# Patient Record
Sex: FEMALE | Race: WHITE | NOT HISPANIC OR LATINO | Employment: STUDENT | URBAN - METROPOLITAN AREA
[De-identification: names, ages, dates, MRNs, and addresses within clinical notes are randomized per-mention and may not be internally consistent; named-entity substitution may affect disease eponyms.]

---
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
## 2021-02-11 ENCOUNTER — MYC REFILL (OUTPATIENT)
Dept: FAMILY MEDICINE | Facility: CLINIC | Age: 21
End: 2021-02-11

## 2021-02-11 DIAGNOSIS — Z86.19 HISTORY OF COLD SORES: ICD-10-CM

## 2021-02-11 RX ORDER — VALACYCLOVIR HYDROCHLORIDE 500 MG/1
500 TABLET, FILM COATED ORAL DAILY
Qty: 30 TABLET | Refills: 0 | Status: SHIPPED | OUTPATIENT
Start: 2021-02-11 | End: 2021-05-17

## 2021-02-11 NOTE — TELEPHONE ENCOUNTER
Pending Prescriptions:                       Disp   Refills    valACYclovir (VALTREX) 500 MG tablet       90 tab*0        Sig: Take 1 tablet (500 mg) by mouth daily        Routing refill request to provider for review/approval because:  Patient needs to be seen because:  Over a year.     Last Seen: 08/06/2019  Last Refilled: 05/08/20   Next Visit: SUZIE Zavala RN, BSN  [place] [facility]  February 11, 2021

## 2021-03-10 DIAGNOSIS — Z86.19 HISTORY OF COLD SORES: ICD-10-CM

## 2021-03-11 NOTE — TELEPHONE ENCOUNTER
Pending Prescriptions:                       Disp   Refills    valACYclovir (VALTREX) 500 MG tablet       30 tab*0        Sig: Take 1 tablet (500 mg) by mouth daily    Routing refill request to provider for review/approval because:  Labs not current:    Creatinine   Date Value Ref Range Status   10/20/2017 0.68 0.50 - 1.00 mg/dL Final         Patient needs to be seen because it has been more than 1 year since last office visit.

## 2021-03-12 ENCOUNTER — MYC REFILL (OUTPATIENT)
Dept: FAMILY MEDICINE | Facility: CLINIC | Age: 21
End: 2021-03-12

## 2021-03-12 DIAGNOSIS — Z86.19 HISTORY OF COLD SORES: ICD-10-CM

## 2021-03-12 RX ORDER — VALACYCLOVIR HYDROCHLORIDE 500 MG/1
500 TABLET, FILM COATED ORAL DAILY
Qty: 30 TABLET | Refills: 0 | OUTPATIENT
Start: 2021-03-12

## 2021-03-12 NOTE — TELEPHONE ENCOUNTER
Spoke with pt and informed her to schedule her appt but declined as she is still in [place].      Nena Goyal CMA ([facility])

## 2021-03-17 ENCOUNTER — MYC REFILL (OUTPATIENT)
Dept: FAMILY MEDICINE | Facility: CLINIC | Age: 21
End: 2021-03-17

## 2021-03-17 DIAGNOSIS — Z86.19 HISTORY OF COLD SORES: ICD-10-CM

## 2021-03-18 ENCOUNTER — MYC MEDICAL ADVICE (OUTPATIENT)
Dept: FAMILY MEDICINE | Facility: CLINIC | Age: 21
End: 2021-03-18

## 2021-03-18 DIAGNOSIS — Z86.19 HISTORY OF COLD SORES: ICD-10-CM

## 2021-03-18 RX ORDER — VALACYCLOVIR HYDROCHLORIDE 500 MG/1
500 TABLET, FILM COATED ORAL DAILY
Qty: 30 TABLET | Refills: 0 | OUTPATIENT
Start: 2021-03-18

## 2021-03-18 NOTE — TELEPHONE ENCOUNTER
Pending Prescriptions:                       Disp   Refills    valACYclovir (VALTREX) 500 MG tablet       30 tab*0        Sig: Take 1 tablet (500 mg) by mouth daily      Routing refill request to provider for review/approval because:  Akila given x1 and patient did not follow up, please advise    Inez Mccray RN on 3/18/2021 at 11:18 AM

## 2021-03-19 RX ORDER — VALACYCLOVIR HYDROCHLORIDE 500 MG/1
500 TABLET, FILM COATED ORAL DAILY
Qty: 30 TABLET | Refills: 0 | OUTPATIENT
Start: 2021-03-19

## 2021-03-19 NOTE — TELEPHONE ENCOUNTER
Pending Prescriptions:                       Disp   Refills    valACYclovir (VALTREX) 500 MG tablet       30 tab*0        Sig: Take 1 tablet (500 mg) by mouth daily      Routing refill request to provider for review/approval because:  Akila given x1 and patient did not follow up, please advise  Labs not current:  creatinine    Inez Mccray, RN on 3/19/2021 at 9:40 AM

## 2021-04-04 ENCOUNTER — HEALTH MAINTENANCE LETTER (OUTPATIENT)
Age: 21
End: 2021-04-04

## 2021-04-23 DIAGNOSIS — R00.0 TACHYCARDIA: ICD-10-CM

## 2021-04-23 RX ORDER — ATENOLOL 50 MG/1
50 TABLET ORAL DAILY
Qty: 90 TABLET | Refills: 0 | Status: SHIPPED | OUTPATIENT
Start: 2021-04-23 | End: 2021-07-19 | Stop reason: ALTCHOICE

## 2021-04-23 NOTE — LETTER
April 23, 2021       TO: Elizabeth Ulloa  [address] [facility] [address]       Dear Elizabeth Ulloa,    We recently received a call from your pharmacy requesting a refill of your Atenolol.    Our records indicate that you are due for follow-up with your Heart Care Provider. We will refill your medications for 3 months which will allow you enough time to be seen.    Please call [phone] to schedule your appointment.    Thank you for allowing [facility] to be a part of your health care team and we look forward to seeing you soon.    Thank you,    [facility]

## 2021-04-23 NOTE — TELEPHONE ENCOUNTER
Received refill request for:  Atenolol  Last OV was: 1/13/2020  Labs/EKG: na  F/U scheduled: overdue orders from 1/202.  Note to pharmacy and letter sent.  New script sent to: [facility]

## 2021-05-17 ENCOUNTER — OFFICE VISIT (OUTPATIENT)
Dept: DERMATOLOGY | Facility: CLINIC | Age: 21
End: 2021-05-17
Payer: COMMERCIAL

## 2021-05-17 VITALS — RESPIRATION RATE: 20 BRPM | DIASTOLIC BLOOD PRESSURE: 72 MMHG | SYSTOLIC BLOOD PRESSURE: 131 MMHG | HEART RATE: 62 BPM

## 2021-05-17 DIAGNOSIS — D18.01 HEMANGIOMA OF SKIN: Primary | ICD-10-CM

## 2021-05-17 DIAGNOSIS — Z86.19 HISTORY OF COLD SORES: ICD-10-CM

## 2021-05-17 PROCEDURE — 99214 OFFICE O/P EST MOD 30 MIN: CPT | Performed by: PHYSICIAN ASSISTANT

## 2021-05-17 RX ORDER — VALACYCLOVIR HYDROCHLORIDE 500 MG/1
500 TABLET, FILM COATED ORAL DAILY
Qty: 90 TABLET | Refills: 3 | Status: SHIPPED | OUTPATIENT
Start: 2021-05-17 | End: 2022-05-19

## 2021-05-17 NOTE — PROGRESS NOTES
HPI:   Elizabeth Ulloa is a 20 year old female who presents for recheck of vascular growth on the scalp. Area has grown recently. She and mother wouldlike to discuss options for removal.     Location: right frontal scalp   Condition present for:  Many years - since childhood but not since birth.   Previous treatments include: has unknown laser years ago which did not seem to help.       PHYSICAL EXAM:    /72 (BP Location: Right arm, Patient Position: Sitting, Cuff Size: Adult Regular)   Pulse 62   Resp 20   Skin exam performed as follows: Type 1 skin. Mood appropriate  Alert and Oriented X 3. Well developed, well nourished in no distress.  General appearance: Normal  Head including face: Normal  Eyes: conjunctiva and lids: Normal  Mouth: Lips, teeth, gums: Normal  Neck: Normal  Chest-breast/axillae: Normal  Back: Normal  Spleen and liver: Normal  Cardiovascular: Exam of peripheral vascular system by observation for swelling, varicosities, edema: Normal  Genitalia: groin, buttocks: Normal  Extremities: digits/nails (clubbing): Normal  Eccrine and Apocrine glands: Normal  Right upper extremity: Normal  Left upper extremity: Normal  Right lower extremity: Normal  Left lower extremity: Normal  Skin: Scalp and body hair: See below    1. 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 Saw peds derm in 2016 who questioned possible lymphatic involvement. She is quite nervous about removal and would like to be placed under general anesthesia. Will refer to general surgery.  2. History of HSV - gets > 6 outbreaks per year.   --Start valtrex daily           Follow-up: PRN  CC:   Scribed By: Rahel Coleman, MS, PA-C

## 2021-05-17 NOTE — PROGRESS NOTES
Chief Complaint   Patient presents with     RECHECK     Lesion on right head- measure        Vitals:    05/17/21 1013   BP: 131/72   BP Location: Right arm   Patient Position: Sitting   Cuff Size: Adult Regular   Pulse: 62   Resp: 20     Wt Readings from Last 1 Encounters:   01/13/20 78.1 kg (172 lb 1.6 oz) (94 %, Z= 1.52)*     * Growth percentiles are based on CDC (Girls, 2-20 Years) data.       5/17/2021    Mariano GUAJARDO RN   [facility]

## 2021-05-17 NOTE — LETTER
5/17/2021         RE: Elizabeth Ulloa  [address] [facility] [address]        Dear Colleague,    Thank you for referring your patient, Elizabeth Ulloa, to the [facility]. Please see a copy of my visit note below.    Chief Complaint   Patient presents with     RECHECK     Lesion on right head- measure        Vitals:    05/17/21 1013   BP: 131/72   BP Location: Right arm   Patient Position: Sitting   Cuff Size: Adult Regular   Pulse: 62   Resp: 20     Wt Readings from Last 1 Encounters:   01/13/20 78.1 kg (172 lb 1.6 oz) (94 %, Z= 1.52)*     * Growth percentiles are based on CDC (Girls, 2-20 Years) data.       5/17/2021    Mariano GUAJARDO RN   Specialty Clinics       HPI:   Elizabeth Ulloa is a 20 year old female who presents for recheck of vascular growth on the scalp. Area has grown recently. She and mother wouldlike to discuss options for removal.     Location: right frontal scalp   Condition present for:  Many years - since childhood but not since birth.   Previous treatments include: has unknown laser years ago which did not seem to help.       PHYSICAL EXAM:    /72 (BP Location: Right arm, Patient Position: Sitting, Cuff Size: Adult Regular)   Pulse 62   Resp 20   Skin exam performed as follows: Type 1 skin. Mood appropriate  Alert and Oriented X 3. Well developed, well nourished in no distress.  General appearance: Normal  Head including face: Normal  Eyes: conjunctiva and lids: Normal  Mouth: Lips, teeth, gums: Normal  Neck: Normal  Chest-breast/axillae: Normal  Back: Normal  Spleen and liver: Normal  Cardiovascular: Exam of peripheral vascular system by observation for swelling, varicosities, edema: Normal  Genitalia: groin, buttocks: Normal  Extremities: digits/nails (clubbing): Normal  Eccrine and Apocrine glands: Normal  Right upper extremity: Normal  Left upper extremity: Normal  Right lower extremity: Normal  Left lower extremity: Normal  Skin: Scalp and body  hair: See below    1. 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 Saw peds derm in 2016 who questioned possible lymphatic involvement. She is quite nervous about removal and would like to be placed under general anesthesia. Will refer to general surgery.  2. History of HSV - gets > 6 outbreaks per year.   --Start valtrex daily           Follow-up: PRN  CC:   Scribed By: Rahel Coleman MS, PA-C        Again, thank you for allowing me to participate in the care of your patient.        Sincerely,        Rahel Coleman PA-C

## 2021-05-18 ENCOUNTER — TELEPHONE (OUTPATIENT)
Dept: DERMATOLOGY | Facility: CLINIC | Age: 21
End: 2021-05-18

## 2021-05-18 NOTE — TELEPHONE ENCOUNTER
Please call patient - I discussed removal of the hemangioma with Dr Varma and he recommended she be referred to general surgery for the removal (because she would like to be put under for the procedure). Let me know if she needs a referral.

## 2021-05-19 NOTE — TELEPHONE ENCOUNTER
Left message for pt to call back for message from provider.  Dara JOLLY RN BSN PHN  [facility]ty Clin[facility]

## 2021-05-20 NOTE — TELEPHONE ENCOUNTER
Pt notified of below.  Pt reports understanding.  Pt does not have further questions or concerns.    Patient is not ready to see Gen Surg yet.  She will call back for an appointment when ready.    Evy Strong   [facility]  RN

## 2021-06-21 ENCOUNTER — OFFICE VISIT (OUTPATIENT)
Dept: CARDIOLOGY | Facility: CLINIC | Age: 21
End: 2021-06-21
Payer: COMMERCIAL

## 2021-06-21 VITALS
HEIGHT: 65 IN | BODY MASS INDEX: 29.36 KG/M2 | WEIGHT: 176.2 LBS | HEART RATE: 68 BPM | OXYGEN SATURATION: 100 % | DIASTOLIC BLOOD PRESSURE: 86 MMHG | SYSTOLIC BLOOD PRESSURE: 118 MMHG

## 2021-06-21 DIAGNOSIS — R00.0 TACHYCARDIA: ICD-10-CM

## 2021-06-21 PROCEDURE — 99213 OFFICE O/P EST LOW 20 MIN: CPT | Performed by: INTERNAL MEDICINE

## 2021-06-21 RX ORDER — METOPROLOL SUCCINATE 50 MG/1
50 TABLET, EXTENDED RELEASE ORAL DAILY
Qty: 90 TABLET | Refills: 3 | Status: SHIPPED | OUTPATIENT
Start: 2021-06-21 | End: 2021-10-14 | Stop reason: ALTCHOICE

## 2021-06-21 RX ORDER — ATENOLOL 50 MG/1
50 TABLET ORAL DAILY
Qty: 90 TABLET | Refills: 3 | Status: CANCELLED | OUTPATIENT
Start: 2021-06-21

## 2021-06-21 ASSESSMENT — MIFFLIN-ST. JEOR: SCORE: 1571.31

## 2021-06-21 NOTE — LETTER
6/21/2021    Physician No Ref-Primary  No address on file    RE: Elizabeth Ulloa       Dear Colleague,    I had the pleasure of seeing Elizabeth Ulloa in the [facility].    HISTORY:    Elizabeth Ulloa is a pleasant 20-year-old female accompanied by her mother and a sibling today.  She has a history of palpitations previously cared for through [facility] pediatric cardiology department.  Her monitor showed what was believed to be sinus tachycardia although atrial tachycardia was a possibility, so she underwent an EP study in February 2017.  She was noted at that time to have a dual AV node physiology but did not have inducible SVT so ablation was not felt to be useful.  He was started on atenolol twice daily at that time.    Historically a Zio patch done before her EP work-up showed several episodes of SVT, the longest lasting 26 minutes with a rate ranging from 174-307, averaging 227.  The rhythm was narrow and regular.  A follow-up study on atenolol showed no episodes of SVT but some once of palpitations was reported and was associated with mild sinus tachycardia.    I first saw Elizabeth in January of last year at which time she told me that she had not experienced any episodes of SVT in the past year.  Today she reports that her symptoms are about the same as they were when she first met me, although now she reports that she experiences a sense of palpitations about once a month.  These episodes typically last 1 to 2 minutes and are only mildly bothersome.  She has not had syncope or near syncope.  She has never identified any regular mechanism.  Her episodes seem to be completely random.  She acknowledges that while she is aware of these episodes, she is only mildly bothered by them and finds them very tolerable.      ASSESSMENT/PLAN:    1.  SVT.  Previous EP study showed dual AV node physiology but the patient was noninducible and she did not undergo  ablation.  She is continuing to use atenolol on a daily basis.  I switched her off of atenolol and onto metoprolol because she is a female of childbearing age and metoprolol is a safer beta-blocker should she become pregnant.  She seems to have had good results with the atenolol and she is satisfied with the small degree of residual palpitation she still experiences.  I am hoping that she will have an identical experience with her Toprol.  She will call me if she does not.  Her mother wondered whether her atenolol might be the cause of her anxiety.  I think this is very unlikely but we will be switching medications so the question is moot.    Thank you for inviting me to participate in your patient's care.  2-year follow-up will be planned but I would be happy to see her sooner should her palpitations worsen or should there be other cardiology issues.  I spent greater than 20 minutes today reviewing the chart, interviewing the patient, and documenting our visit.    Chart documentation was completed, in part, with [employer] voice-recognition software. Even though reviewed, some grammatical, spelling, and word errors may remain.       Orders Placed This Encounter   Procedures     Follow-Up with Cardiologist     Orders Placed This Encounter   Medications     metoprolol succinate ER (TOPROL XL) 50 MG 24 hr tablet     Sig: Take 1 tablet (50 mg) by mouth daily     Dispense:  90 tablet     Refill:  3     There are no discontinued medications.    10 year ASCVD risk: The ASCVD Risk score ([place] BENNIE Jr., et al., 2013) failed to calculate for the following reasons:    The 2013 ASCVD risk score is only valid for ages 40 to 79    Encounter Diagnosis   Name Primary?     Tachycardia        CURRENT MEDICATIONS:  Current Outpatient Medications   Medication Sig Dispense Refill     atenolol (TENORMIN) 50 MG tablet Take 1 tablet (50 mg) by mouth daily 90 tablet 0     levonorgestrel (MIRENA) 20 MCG/24HR IUD 1 each (20 mcg) by Intrauterine  route continuous       metoprolol succinate ER (TOPROL XL) 50 MG 24 hr tablet Take 1 tablet (50 mg) by mouth daily 90 tablet 3     valACYclovir (VALTREX) 500 MG tablet Take 1 tablet (500 mg) by mouth daily 90 tablet 3     glycopyrrolate (ROBINUL-FORTE) 2 MG tablet 1-3 tablets daily PRN (Patient not taking: Reported on 6/21/2021) 90 tablet 5       ALLERGIES     Allergies   Allergen Reactions     Latex Rash     burn       PAST MEDICAL HISTORY:  Past Medical History:   Diagnosis Date     Contraception, device intrauterine 7/30/2018    Mirena - due for removal 7/2023.      Hemangioma      SVT (supraventricular tachycardia) (H)     sees EP specialist       PAST SURGICAL HISTORY:  Past Surgical History:   Procedure Laterality Date     EP ABLATION CHILD N/A 2/6/2017    Procedure: EP ABLATION CHILD;  Surgeon: Clara Dias MD;  [facility]: [facility]     EP STUDY CHILD N/A 2/6/2017    Procedure: EP STUDY CHILD;  Surgeon: Clara Dias MD;  [facility]     EP STUDY CHILD N/A 10/23/2017    Procedure: EP STUDY CHILD;  EP Study With Ablation ;  Surgeon: Clara Dias MD;  [facility]       FAMILY HISTORY:  Family History   Problem Relation Age of Onset     Melanoma No family hx of        SOCIAL HISTORY:  Social History     Socioeconomic History     Marital status: Single     Spouse name: Not on file     Number of children: Not on file     Years of education: Not on file     Highest education level: Not on file   Occupational History     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   Substance and Sexual Activity     Alcohol use: No     Drug use: No     Sexual activity: Yes     Partners: Male     Birth  "control/protection: I.U.D.   Lifestyle     Physical activity     Days per week: Not on file     Minutes per session: Not on file     Stress: Not on file   Relationships     Social connections     Talks on phone: Not on file     Gets together: Not on file     Attends Moravian service: Not on file     Active member of club or organization: Not on file     Attends meetings of clubs or organizations: Not on file     Relationship status: Not on file     Intimate partner violence     Fear of current or ex partner: Not on file     Emotionally abused: Not on file     Physically abused: Not on file     Forced sexual activity: Not on file   Other Topics Concern     Not on file   Social History Narrative     Not on file       Review of Systems:  Skin:  Negative     Eyes:  Positive for glasses  ENT:  Negative    Respiratory:  Negative    Cardiovascular:  Negative for;palpitations;edema;lightheadedness;dizziness chest pain;Positive for  Gastroenterology: Negative    Genitourinary:  Negative    Musculoskeletal:  Negative    Neurologic:  Negative    Psychiatric:  Negative    Heme/Lymph/Imm:  Positive for allergies  Endocrine:  Negative      Physical Exam:  Vitals: /86 (BP Location: Right arm, Patient Position: Sitting, Cuff Size: Adult Regular)   Pulse 68   Ht 1.645 m (5' 4.76\")   Wt 79.9 kg (176 lb 3.2 oz)   SpO2 100%   BMI 29.54 kg/m      Constitutional:           Skin:           Head:           Eyes:           ENT:           Neck:           Chest:           Cardiac:                    Abdomen:           Vascular:                                        Extremities and Muscular Skeletal:              Neurological:           Psych:        Recent Lab Results:  LIPID RESULTS:  No results found for: CHOL, HDL, LDL, TRIG, CHOLHDLRATIO    LIVER ENZYME RESULTS:  Lab Results   Component Value Date    AST 9 10/20/2017    ALT 23 10/20/2017       CBC RESULTS:  Lab Results   Component Value Date    WBC 7.3 10/20/2017    RBC 4.99 " 10/20/2017    HGB 15.0 10/20/2017    HCT 44.9 10/20/2017    MCV 90 10/20/2017    MCH 30.1 10/20/2017    MCHC 33.4 10/20/2017    RDW 11.9 10/20/2017     10/20/2017       BMP RESULTS:  Lab Results   Component Value Date     10/20/2017    POTASSIUM 4.1 10/20/2017    CHLORIDE 105 10/20/2017    CO2 26 10/20/2017    ANIONGAP 8 10/20/2017    GLC 90 10/20/2017    BUN 11 10/20/2017    CR 0.68 10/20/2017    GFRESTIMATED >90 10/20/2017    GFRESTBLACK >90 10/20/2017    JOSE 9.5 10/20/2017        A1C RESULTS:  No results found for: A1C    INR RESULTS:  No results found for: INR      Drew Miramontes MD, [facility]    CC  No referring provider defined for this encounter.

## 2021-06-21 NOTE — PROGRESS NOTES
HISTORY:    Elizabeth Ulloa is a pleasant 20-year-old female accompanied by her mother and a sibling today.  She has a history of palpitations previously cared for through [facility] pediatric cardiology department.  Her monitor showed what was believed to be sinus tachycardia although atrial tachycardia was a possibility, so she underwent an EP study in February 2017.  She was noted at that time to have a dual AV node physiology but did not have inducible SVT so ablation was not felt to be useful.  He was started on atenolol twice daily at that time.    Historically a Zio patch done before her EP work-up showed several episodes of SVT, the longest lasting 26 minutes with a rate ranging from 174-307, averaging 227.  The rhythm was narrow and regular.  A follow-up study on atenolol showed no episodes of SVT but some once of palpitations was reported and was associated with mild sinus tachycardia.    I first saw Elizabeth in January of last year at which time she told me that she had not experienced any episodes of SVT in the past year.  Today she reports that her symptoms are about the same as they were when she first met me, although now she reports that she experiences a sense of palpitations about once a month.  These episodes typically last 1 to 2 minutes and are only mildly bothersome.  She has not had syncope or near syncope.  She has never identified any regular mechanism.  Her episodes seem to be completely random.  She acknowledges that while she is aware of these episodes, she is only mildly bothered by them and finds them very tolerable.      ASSESSMENT/PLAN:    1.  SVT.  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  She is continuing to use atenolol on a daily basis.  I switched her off of atenolol and onto metoprolol because she is a female of childbearing age and metoprolol is a safer beta-blocker should she become pregnant.  She seems to  have had good results with the atenolol and she is satisfied with the small degree of residual palpitation she still experiences.  I am hoping that she will have an identical experience with her Toprol.  She will call me if she does not.  Her mother wondered whether her atenolol might be the cause of her anxiety.  I think this is very unlikely but we will be switching medications so the question is moot.    Thank you for inviting me to participate in your patient's care.  2-year follow-up will be planned but I would be happy to see her sooner should her palpitations worsen or should there be other cardiology issues.  I spent greater than 20 minutes today reviewing the chart, interviewing the patient, and documenting our visit.    Chart documentation was completed, in part, with [employer] voice-recognition software. Even though reviewed, some grammatical, spelling, and word errors may remain.       Orders Placed This Encounter   Procedures     Follow-Up with Cardiologist     Orders Placed This Encounter   Medications     metoprolol succinate ER (TOPROL XL) 50 MG 24 hr tablet     Sig: Take 1 tablet (50 mg) by mouth daily     Dispense:  90 tablet     Refill:  3     There are no discontinued medications.    10 year ASCVD risk: The ASCVD Risk score ([place] DC Jr., et al., 2013) failed to calculate for the following reasons:    The 2013 ASCVD risk score is only valid for ages 40 to 79    Encounter Diagnosis   Name Primary?     Tachycardia        CURRENT MEDICATIONS:  Current Outpatient Medications   Medication Sig Dispense Refill     atenolol (TENORMIN) 50 MG tablet Take 1 tablet (50 mg) by mouth daily 90 tablet 0     levonorgestrel (MIRENA) 20 MCG/24HR IUD 1 each (20 mcg) by Intrauterine route continuous       metoprolol succinate ER (TOPROL XL) 50 MG 24 hr tablet Take 1 tablet (50 mg) by mouth daily 90 tablet 3     valACYclovir (VALTREX) 500 MG tablet Take 1 tablet (500 mg) by mouth daily 90 tablet 3     glycopyrrolate  (ROBINUL-FORTE) 2 MG tablet 1-3 tablets daily PRN (Patient not taking: Reported on 6/21/2021) 90 tablet 5       ALLERGIES     Allergies   Allergen Reactions     Latex Rash     burn       PAST MEDICAL HISTORY:  Past Medical History:   Diagnosis Date     Contraception, device intrauterine 7/30/2018    Mirena - due for removal 7/2023.      Hemangioma      SVT (supraventricular tachycardia) (H)     sees EP specialist       PAST SURGICAL HISTORY:  Past Surgical History:   Procedure Laterality Date     EP ABLATION CHILD N/A 2/6/2017    Procedure: EP ABLATION CHILD;  Surgeon: Clara Dias MD;  [facility]     EP STUDY CHILD N/A 2/6/2017    Procedure: EP STUDY CHILD;  Surgeon: Clara Dias MD;  [facility]     EP STUDY CHILD N/A 10/23/2017    Procedure: EP STUDY CHILD;  EP Study With Ablation ;  Surgeon: Clara Dias MD;  [facility]       FAMILY HISTORY:  Family History   Problem Relation Age of Onset     Melanoma No family hx of        SOCIAL HISTORY:  Social History     Socioeconomic History     Marital status: Single     Spouse name: Not on file     Number of children: Not on file     Years of education: Not on file     Highest education level: Not on file   Occupational History     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   Substance and Sexual Activity     Alcohol use: No     Drug use: No     Sexual activity: Yes     Partners: Male     Birth control/protection: I.U.D.   Lifestyle     Physical activity     Days per week: Not on file     Minutes per session: Not on file     Stress: Not on file   Relationships     Social connections     Talks on phone: Not on file     Gets together: Not on file  "    Attends Yazidism service: Not on file     Active member of club or organization: Not on file     Attends meetings of clubs or organizations: Not on file     Relationship status: Not on file     Intimate partner violence     Fear of current or ex partner: Not on file     Emotionally abused: Not on file     Physically abused: Not on file     Forced sexual activity: Not on file   Other Topics Concern     Not on file   Social History Narrative     Not on file       Review of Systems:  Skin:  Negative     Eyes:  Positive for glasses  ENT:  Negative    Respiratory:  Negative    Cardiovascular:  Negative for;palpitations;edema;lightheadedness;dizziness chest pain;Positive for  Gastroenterology: Negative    Genitourinary:  Negative    Musculoskeletal:  Negative    Neurologic:  Negative    Psychiatric:  Negative    Heme/Lymph/Imm:  Positive for allergies  Endocrine:  Negative      Physical Exam:  Vitals: /86 (BP Location: Right arm, Patient Position: Sitting, Cuff Size: Adult Regular)   Pulse 68   Ht 1.645 m (5' 4.76\")   Wt 79.9 kg (176 lb 3.2 oz)   SpO2 100%   BMI 29.54 kg/m      Constitutional:           Skin:           Head:           Eyes:           ENT:           Neck:           Chest:           Cardiac:                    Abdomen:           Vascular:                                        Extremities and Muscular Skeletal:              Neurological:           Psych:        Recent Lab Results:  LIPID RESULTS:  No results found for: CHOL, HDL, LDL, TRIG, CHOLHDLRATIO    LIVER ENZYME RESULTS:  Lab Results   Component Value Date    AST 9 10/20/2017    ALT 23 10/20/2017       CBC RESULTS:  Lab Results   Component Value Date    WBC 7.3 10/20/2017    RBC 4.99 10/20/2017    HGB 15.0 10/20/2017    HCT 44.9 10/20/2017    MCV 90 10/20/2017    MCH 30.1 10/20/2017    MCHC 33.4 10/20/2017    RDW 11.9 10/20/2017     10/20/2017       BMP RESULTS:  Lab Results   Component Value Date     10/20/2017    " POTASSIUM 4.1 10/20/2017    CHLORIDE 105 10/20/2017    CO2 26 10/20/2017    ANIONGAP 8 10/20/2017    GLC 90 10/20/2017    BUN 11 10/20/2017    CR 0.68 10/20/2017    GFRESTIMATED >90 10/20/2017    GFRESTBLACK >90 10/20/2017    JOSE 9.5 10/20/2017        A1C RESULTS:  No results found for: A1C    INR RESULTS:  No results found for: INR      Drew Miramontes MD, [facility]    CC  No referring provider defined for this encounter.

## 2021-07-19 ENCOUNTER — DOCUMENTATION ONLY (OUTPATIENT)
Dept: CARDIOLOGY | Facility: CLINIC | Age: 21
End: 2021-07-19

## 2021-07-19 NOTE — PROGRESS NOTES
Medication list updated as pt was switched from atenolol to Metoprolol Succinate on 6/21/2021 by Dr. Miramontes.  KMortimer RN

## 2021-08-10 NOTE — LETTER
3/4/2019         RE: Elizabeth Ulloa  [address] [facility] [address]        Dear Colleague,    Thank you for referring your patient, Elizabeth Ulloa, to the [facility]. Please see a copy of my visit note below.    HPI:   Elizabeth Ulloa is a 18 year old female who presents for recheck of vascular growth on the scalp. Area has grown recently and is more tender. Would like to discuss options for removal.   chief complaint  Location: right frontal scalp   Condition present for:  Many years - since childhood but not since birth.   Previous treatments include: has unknown laser years ago which did not seem to help.     Review Of Systems  Eyes: negative  Ears/Nose/Throat: negative  Respiratory: No shortness of breath, dyspnea on exertion, cough, or hemoptysis  Cardiovascular: negative  Gastrointestinal: negative  Genitourinary: negative  Musculoskeletal: negative  Neurologic: negative  Psychiatric: negative        PHYSICAL EXAM:    /63   Pulse 56   SpO2 99%   Skin exam performed as follows: Type 1 skin. Mood appropriate  Alert and Oriented X 3. Well developed, well nourished in no distress.  General appearance: Normal  Head including face: Normal  Eyes: conjunctiva and lids: Normal  Mouth: Lips, teeth, gums: Normal  Neck: Normal  Chest-breast/axillae: Normal  Back: Normal  Spleen and liver: Normal  Cardiovascular: Exam of peripheral vascular system by observation for swelling, varicosities, edema: Normal  Genitalia: groin, buttocks: Normal  Extremities: digits/nails (clubbing): Normal  Eccrine and Apocrine glands: Normal  Right upper extremity: Normal  Left upper extremity: Normal  Right lower extremity: Normal  Left lower extremity: Normal  Skin: Scalp and body hair: See below    1. 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 Saw [facility] derm in 2016 who questioned possible  lymphatic involvement. Discussed excision with Dr Varma as I do not think laser will be effective for this lesions due to size and depth. She is not sure if she wants excision and will think about it. Will discuss with Dr Varma to see if he would like to obtain a US of the area prior to excision.   2. Hyperhidrosis - advised. Gets on trunk, legs, arms, axilla. Has used numerous topicals including Drysol. Discussed Landon Sergioelizabeth and she is amenable to this. Does have a h/o SVT - advised to call cardiologist prior to starting to ensure no contraindications with her SVT.         Follow-up: Excision if wanted/yearly  CC:   Scribed By: Rahel Coleman, MS, PAAJ      Again, thank you for allowing me to participate in the care of your patient.        Sincerely,        Rahel Coleman PA-C     Home

## 2021-09-05 ENCOUNTER — MYC MEDICAL ADVICE (OUTPATIENT)
Dept: CARDIOLOGY | Facility: CLINIC | Age: 21
End: 2021-09-05

## 2021-09-07 ENCOUNTER — TELEPHONE (OUTPATIENT)
Dept: CARDIOLOGY | Facility: CLINIC | Age: 21
End: 2021-09-07

## 2021-09-07 NOTE — TELEPHONE ENCOUNTER
My Chart message message:    need to be changed back to Atenolol.  I can barely function. I am always so tired and out of breathe  because my heart is pounding. I wanted to give the medicine a try but it's not right for me. I started it 8-14.   Will forward to Dr. Miramontes patient was on was on atenolol 25mg bid (50mg daily) Kristel Burch RN on 9/7/2021 at 10:39 AM      6-21-21 OV   SVT.  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  She is continuing to use atenolol on a daily basis.  I switched her off of atenolol and onto metoprolol because she is a female of childbearing age and metoprolol is a safer beta-blocker should she become pregnant.  She seems to have had good results with the atenolol and she is satisfied with the small degree of residual palpitation she still experiences.  I am hoping that she will have an identical experience with her Toprol.  She will call me if she does not.  Her mother wondered whether her atenolol might be the cause of her anxiety.  I think this is very unlikely but we will be switching medications so the question is moot.

## 2021-09-18 ENCOUNTER — HEALTH MAINTENANCE LETTER (OUTPATIENT)
Age: 21
End: 2021-09-18

## 2021-10-11 ENCOUNTER — MYC MEDICAL ADVICE (OUTPATIENT)
Dept: CARDIOLOGY | Facility: CLINIC | Age: 21
End: 2021-10-11

## 2021-10-12 NOTE — TELEPHONE ENCOUNTER
Patient does not like the Toprol XL she has extreme fatigue and more palpitations than she had on the atenolol.  She would like to go back to Atenelol 25mg BID.

## 2021-10-13 ENCOUNTER — MYC MEDICAL ADVICE (OUTPATIENT)
Dept: CARDIOLOGY | Facility: CLINIC | Age: 21
End: 2021-10-13

## 2021-10-13 DIAGNOSIS — R00.0 TACHYCARDIA: Primary | ICD-10-CM

## 2021-10-14 RX ORDER — ATENOLOL 50 MG/1
50 TABLET ORAL DAILY
Qty: 90 TABLET | Refills: 3 | Status: SHIPPED | OUTPATIENT
Start: 2021-10-14 | End: 2022-10-20

## 2022-04-30 ENCOUNTER — HEALTH MAINTENANCE LETTER (OUTPATIENT)
Age: 22
End: 2022-04-30

## 2022-05-17 DIAGNOSIS — Z86.19 HISTORY OF COLD SORES: ICD-10-CM

## 2022-05-17 NOTE — TELEPHONE ENCOUNTER
Requested Prescriptions   Pending Prescriptions Disp Refills     valACYclovir (VALTREX) 500 MG tablet 90 tablet 3     Sig: Take 1 tablet (500 mg) by mouth daily       There is no refill protocol information for this order        Last office visit: 5/17/2021 with prescribing provider:  PIERCE HANKINS    Future Office Visit:          Tejinder Liu  [facility]

## 2022-05-19 RX ORDER — VALACYCLOVIR HYDROCHLORIDE 500 MG/1
500 TABLET, FILM COATED ORAL DAILY
Qty: 90 TABLET | Refills: 0 | Status: SHIPPED | OUTPATIENT
Start: 2022-05-19 | End: 2022-08-16

## 2022-06-02 ENCOUNTER — OFFICE VISIT (OUTPATIENT)
Dept: INTERNAL MEDICINE | Facility: CLINIC | Age: 22
End: 2022-06-02
Payer: COMMERCIAL

## 2022-06-02 VITALS
WEIGHT: 180 LBS | SYSTOLIC BLOOD PRESSURE: 124 MMHG | TEMPERATURE: 98.2 F | HEART RATE: 80 BPM | DIASTOLIC BLOOD PRESSURE: 66 MMHG | HEIGHT: 65 IN | OXYGEN SATURATION: 98 % | RESPIRATION RATE: 18 BRPM | BODY MASS INDEX: 29.99 KG/M2

## 2022-06-02 DIAGNOSIS — G44.209 MUSCLE TENSION HEADACHE: ICD-10-CM

## 2022-06-02 DIAGNOSIS — Z82.49 FAMILY HISTORY OF BRAIN ANEURYSM: Primary | ICD-10-CM

## 2022-06-02 PROCEDURE — 99204 OFFICE O/P NEW MOD 45 MIN: CPT | Performed by: INTERNAL MEDICINE

## 2022-06-02 RX ORDER — MELOXICAM 15 MG/1
15 TABLET ORAL DAILY
Qty: 30 TABLET | Refills: 0 | Status: SHIPPED | OUTPATIENT
Start: 2022-06-02 | End: 2022-07-01

## 2022-06-02 ASSESSMENT — ENCOUNTER SYMPTOMS: HEADACHES: 1

## 2022-06-02 ASSESSMENT — PAIN SCALES - GENERAL: PAINLEVEL: NO PAIN (0)

## 2022-06-02 NOTE — PROGRESS NOTES
"    Linda Johnson is a 21 year old female who presents with nonpulsatile headaches that present in the back of her head for the last 5 weeks.  These headaches seem to come and go.  She does get blurry vision from time to time, without aura.  No numbness or tingling in her extremities.  She does describe that she has bad posture and frequently leans forward at the neck.  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  She had 2 heart procedures in the past to fix her electrical pathway dysfunction but they could not ablate the area.  So she is on atenolol for this issue.    During our visit her mother reported that They have a family history of aneurysms.  Her family members include her aunt- brain aneurysm, uncle- brain aneurysm, grandmother heart aneurysm.    Headache   This is a recurrent problem. The current episode started more than 1 week ago. The problem occurs every few hours. The problem has not changed since onset.The quality of the pain is described as dull. The pain is at a severity of 6/10. She has tried NSAIDs for the symptoms. The treatment provided no relief.     Pain sometimes goes into neck, headaches for 5 weeks. Not drinking caffeine       Review of Systems   Neurological: Positive for headaches.      CONSTITUTIONAL: NEGATIVE for fever, chills, change in weight  ENT/MOUTH: NEGATIVE for ear, mouth and throat problems  RESP: NEGATIVE for significant cough or SOB  CV: NEGATIVE for chest pain, palpitations or peripheral edema  NEURO: Positive for headaches.NEGATIVE for weakness, dizziness or paresthesias.       Objective    /66 (BP Location: Right arm, Patient Position: Sitting, Cuff Size: Adult Regular)   Pulse 80   Temp 98.2  F (36.8  C) (Temporal)   Resp 18   Ht 1.651 m (5' 5\")   Wt 81.6 kg (180 lb)   SpO2 98%   BMI 29.95 kg/m    Body mass index is 29.95 kg/m .  Physical Exam   GENERAL: healthy, alert and no distress  EYES: Eyes " "grossly normal to inspection, PERRL and conjunctivae and sclerae normal  HENT: ear canals and TM's normal, nose and mouth without ulcers or lesions.  No signs of nasal septal swelling.  NECK: no adenopathy, no asymmetry, masses, or scars and thyroid normal to palpation  RESP: lungs clear to auscultation - no rales, rhonchi or wheezes  CV: regular rate and rhythm, normal S1 S2, no S3 or S4, no murmur, click or rub, no peripheral edema and peripheral pulses strong  MS: no gross musculoskeletal defects noted, no edema  NEURO:  mentation intact and speech normal, no neurodeficits.    EMR reviewed       Assessment & Plan         Family history of brain aneurysm  -Due to her family history of brain and heart aneurysms she will need a CTA of her head and neck with contrast.  It is unlikely that her headaches are due to aneurysms, but with family history it does need to be ruled out.  - CTA Head Neck with Contrast    Muscle tension headache  Due to her symptoms and pain originating at the insertion of her neck musculature, muscle tension headaches are likely.  She will take meloxicam for 1 week to alleviate the swelling.  She will follow-up if her symptoms worsen.  She has no neurodeficits, aura, lacrimation, nausea or vomiting.  - meloxicam (MOBIC) 15 MG tablet  Dispense: 30 tablet; Refill: 0       BMI:   Estimated body mass index is 29.95 kg/m  as calculated from the following:    Height as of this encounter: 1.651 m (5' 5\").    Weight as of this encounter: 81.6 kg (180 lb).       Follow-up see assessment/plan.      [facility]        This patient has been interviewed, examined, diagnosed, and informed of the above by me personally.  Medical records and available pertinent information has been reviewed by me personally.  All decisions and discussion have been between myself and the patient/family.  This was done in the presence of Caden Baxter, who acted as a medical " scribe and recorded the events above.  No diagnosis or decision making was made by the above-mentioned scribe.  The patient, and or his/her ensurors will not be billed for the presence or actions of this scribe.  The information recorded by the scribe has been reviewed by me and found to be accurate.

## 2022-06-20 ENCOUNTER — HOSPITAL ENCOUNTER (OUTPATIENT)
Dept: CT IMAGING | Facility: CLINIC | Age: 22
Discharge: HOME | End: 2022-06-20
Attending: INTERNAL MEDICINE | Admitting: INTERNAL MEDICINE
Payer: COMMERCIAL

## 2022-06-20 DIAGNOSIS — Z82.49 FAMILY HISTORY OF BRAIN ANEURYSM: ICD-10-CM

## 2022-06-20 PROCEDURE — 250N000011 HC RX IP 250 OP 636: Performed by: INTERNAL MEDICINE

## 2022-06-20 PROCEDURE — 250N000009 HC RX 250: Performed by: INTERNAL MEDICINE

## 2022-06-20 PROCEDURE — 70496 CT ANGIOGRAPHY HEAD: CPT

## 2022-06-20 RX ORDER — IOPAMIDOL 755 MG/ML
500 INJECTION, SOLUTION INTRAVASCULAR ONCE
Status: COMPLETED | OUTPATIENT
Start: 2022-06-20 | End: 2022-06-20

## 2022-06-20 RX ADMIN — IOPAMIDOL 80 ML: 755 INJECTION, SOLUTION INTRAVENOUS at 09:21

## 2022-06-20 RX ADMIN — SODIUM CHLORIDE 100 ML: 9 INJECTION, SOLUTION INTRAVENOUS at 09:21

## 2022-06-22 ENCOUNTER — MYC MEDICAL ADVICE (OUTPATIENT)
Dept: INTERNAL MEDICINE | Facility: CLINIC | Age: 22
End: 2022-06-22

## 2022-06-28 DIAGNOSIS — G44.209 MUSCLE TENSION HEADACHE: ICD-10-CM

## 2022-07-01 RX ORDER — MELOXICAM 15 MG/1
15 TABLET ORAL DAILY
Qty: 30 TABLET | Refills: 0 | Status: SHIPPED | OUTPATIENT
Start: 2022-07-01 | End: 2022-08-01

## 2022-07-01 NOTE — TELEPHONE ENCOUNTER
Routing refill request to provider for review/approval because:  Labs out of range:  ALT, AST, CBC, CREA    Cristiano Chavez RN

## 2022-08-01 DIAGNOSIS — G44.209 MUSCLE TENSION HEADACHE: ICD-10-CM

## 2022-08-01 RX ORDER — MELOXICAM 15 MG/1
15 TABLET ORAL DAILY
Qty: 30 TABLET | Refills: 0 | Status: SHIPPED | OUTPATIENT
Start: 2022-08-01 | End: 2023-05-15

## 2022-08-01 NOTE — TELEPHONE ENCOUNTER
"   Requested Prescriptions   Pending Prescriptions Disp Refills    meloxicam (MOBIC) 15 MG tablet 30 tablet 0     Sig: Take 1 tablet (15 mg) by mouth daily        NSAID Medications Failed - 8/1/2022  8:00 AM        Failed - Normal ALT on file in past 12 months     Recent Labs   Lab Test 10/20/17  1203   ALT 23               Failed - Normal AST on file in past 12 months       Recent Labs   Lab Test 10/20/17  1203   AST 9             Failed - Recent (12 mo) or future (30 days) visit within the authorizing provider's specialty     Patient has had an office visit with the authorizing provider or a provider within the authorizing providers department within the previous 12 mos or has a future within next 30 days. See \"Patient Info\" tab in inbasket, or \"Choose Columns\" in Meds & Orders section of the refill encounter.              Failed - Normal CBC on file in past 12 months     Recent Labs   Lab Test 10/20/17  1203   WBC 7.3   RBC 4.99   HGB 15.0   HCT 44.9                      Failed - Normal serum creatinine on file in past 12 months     Recent Labs   Lab Test 10/20/17  1203   CR 0.68       Ok to refill medication if creatinine is low          Passed - Blood pressure under 140/90 in past 12 months       BP Readings from Last 3 Encounters:   06/02/22 124/66   06/21/21 118/86   05/17/21 131/72                 Passed - Patient is age 6-64 years        Passed - Medication is active on med list        Passed - No active pregnancy on record        Passed - No positive pregnancy test in past 12 months              KALLIE CroweN, RN     "

## 2022-08-16 DIAGNOSIS — Z86.19 HISTORY OF COLD SORES: ICD-10-CM

## 2022-08-16 RX ORDER — VALACYCLOVIR HYDROCHLORIDE 500 MG/1
500 TABLET, FILM COATED ORAL DAILY
Qty: 90 TABLET | Refills: 0 | Status: SHIPPED | OUTPATIENT
Start: 2022-08-16 | End: 2023-05-15

## 2022-08-16 NOTE — TELEPHONE ENCOUNTER
Routing refill request to provider for review/approval because:    Antivirals for Herpes Protocol Failed 08/16/2022 10:46 AM   Protocol Details  Recent (12 mo) or future (30 days) visit within the authorizing provider's specialty    Normal serum creatinine on file in past 12 months     Please advise for refill request.  Matilde Li RN on 8/16/2022 at 2:44 PM

## 2022-08-16 NOTE — TELEPHONE ENCOUNTER
Requested Prescriptions   Pending Prescriptions Disp Refills     valACYclovir (VALTREX) 500 MG tablet 90 tablet 0     Sig: Take 1 tablet (500 mg) by mouth daily MUST MAKE APPT FOR FURTHER REFILLS       There is no refill protocol information for this order        Last office visit: 5/17/2021 with prescribing provider:  PIERCE HANKINS    Future Office Visit:          Tejinder Liu  [facility]

## 2022-10-20 ENCOUNTER — MYC MEDICAL ADVICE (OUTPATIENT)
Dept: CARDIOLOGY | Facility: CLINIC | Age: 22
End: 2022-10-20

## 2022-10-20 DIAGNOSIS — R00.0 TACHYCARDIA: ICD-10-CM

## 2022-10-20 RX ORDER — ATENOLOL 50 MG/1
50 TABLET ORAL DAILY
Qty: 90 TABLET | Refills: 3 | Status: SHIPPED | OUTPATIENT
Start: 2022-10-20

## 2022-11-10 DIAGNOSIS — Z86.19 HISTORY OF COLD SORES: ICD-10-CM

## 2022-11-10 RX ORDER — VALACYCLOVIR HYDROCHLORIDE 500 MG/1
500 TABLET, FILM COATED ORAL DAILY
Qty: 90 TABLET | Refills: 0 | OUTPATIENT
Start: 2022-11-10

## 2022-11-10 NOTE — TELEPHONE ENCOUNTER
Requested Prescriptions   Pending Prescriptions Disp Refills     valACYclovir (VALTREX) 500 MG tablet 90 tablet 0     Sig: Take 1 tablet (500 mg) by mouth daily MUST MAKE APPT FOR FURTHER REFILLS       There is no refill protocol information for this order          Last office visit: 5/17/2021 with prescribing provider:  Rahel Coleman   Future Office Visit:

## 2022-11-20 ENCOUNTER — HEALTH MAINTENANCE LETTER (OUTPATIENT)
Age: 22
End: 2022-11-20

## 2022-11-24 ENCOUNTER — E-VISIT (OUTPATIENT)
Dept: URGENT CARE | Facility: CLINIC | Age: 22
End: 2022-11-24
Payer: COMMERCIAL

## 2022-11-24 DIAGNOSIS — B00.1 HERPES LABIALIS: Primary | ICD-10-CM

## 2022-11-24 PROCEDURE — 99421 OL DIG E/M SVC 5-10 MIN: CPT | Performed by: PHYSICIAN ASSISTANT

## 2022-11-24 RX ORDER — VALACYCLOVIR HYDROCHLORIDE 1 G/1
2000 TABLET, FILM COATED ORAL 2 TIMES DAILY
Qty: 4 TABLET | Refills: 1 | Status: SHIPPED | OUTPATIENT
Start: 2022-11-24 | End: 2023-05-15

## 2022-11-25 NOTE — PATIENT INSTRUCTIONS
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    Based on your responses, I have prescribed valtrex to treat this. Please follow the instructions on the medication. If you experience irritation of your skin, new rash, or any other new symptoms, you should stop using this medication and contact your primary care provider.    If this treatment does not work for you or your sores are worsening instead of improving or do not resolve in 7 days, please plan to follow-up with your primary care provider. They may be able to offer refills for you for future outbreaks as well.    Thanks for choosing?us?as your health care partner,?   ?   Tom Heredia PA-C?

## 2023-05-10 ENCOUNTER — TELEPHONE (OUTPATIENT)
Dept: FAMILY MEDICINE | Facility: CLINIC | Age: 23
End: 2023-05-10
Payer: COMMERCIAL

## 2023-05-10 NOTE — TELEPHONE ENCOUNTER
Patient called back and relayed note below.  Patient was told to have Mirena replaced after 5 years.  Patient does not want it replaced at current time if it does not need to be.  Patient still wants to keep appointment for physical on 5/15.

## 2023-05-10 NOTE — TELEPHONE ENCOUNTER
Patient has an appt on Monday with Dr Jose. The Appt note states replace IUD with sedative? What does patient mean? We dont do that here? Also Mirena is good for 7 years for Birth control unless she wants it replaced or other reasons.     Lor Cheng MA 5/10/2023

## 2023-05-15 ENCOUNTER — OFFICE VISIT (OUTPATIENT)
Dept: FAMILY MEDICINE | Facility: CLINIC | Age: 23
End: 2023-05-15
Payer: COMMERCIAL

## 2023-05-15 VITALS
WEIGHT: 179 LBS | OXYGEN SATURATION: 98 % | BODY MASS INDEX: 29.82 KG/M2 | TEMPERATURE: 98 F | HEART RATE: 81 BPM | RESPIRATION RATE: 18 BRPM | HEIGHT: 65 IN | SYSTOLIC BLOOD PRESSURE: 110 MMHG | DIASTOLIC BLOOD PRESSURE: 70 MMHG

## 2023-05-15 DIAGNOSIS — Z11.3 SCREENING FOR STDS (SEXUALLY TRANSMITTED DISEASES): ICD-10-CM

## 2023-05-15 DIAGNOSIS — Z86.79 HX OF SUPRAVENTRICULAR TACHYCARDIA: Primary | ICD-10-CM

## 2023-05-15 DIAGNOSIS — Z11.59 NEED FOR HEPATITIS C SCREENING TEST: ICD-10-CM

## 2023-05-15 DIAGNOSIS — Z11.4 SCREENING FOR HIV (HUMAN IMMUNODEFICIENCY VIRUS): ICD-10-CM

## 2023-05-15 DIAGNOSIS — Z12.4 CERVICAL CANCER SCREENING: ICD-10-CM

## 2023-05-15 PROCEDURE — 99395 PREV VISIT EST AGE 18-39: CPT | Performed by: FAMILY MEDICINE

## 2023-05-15 PROCEDURE — 2894A VOIDCORRECT: CPT | Mod: 25 | Performed by: FAMILY MEDICINE

## 2023-05-15 PROCEDURE — 87491 CHLMYD TRACH DNA AMP PROBE: CPT

## 2023-05-15 PROCEDURE — 87591 N.GONORRHOEAE DNA AMP PROB: CPT

## 2023-05-15 PROCEDURE — G0145 SCR C/V CYTO,THINLAYER,RESCR: HCPCS | Performed by: FAMILY MEDICINE

## 2023-05-15 ASSESSMENT — PATIENT HEALTH QUESTIONNAIRE - PHQ9
SUM OF ALL RESPONSES TO PHQ QUESTIONS 1-9: 13
10. IF YOU CHECKED OFF ANY PROBLEMS, HOW DIFFICULT HAVE THESE PROBLEMS MADE IT FOR YOU TO DO YOUR WORK, TAKE CARE OF THINGS AT HOME, OR GET ALONG WITH OTHER PEOPLE: SOMEWHAT DIFFICULT
SUM OF ALL RESPONSES TO PHQ QUESTIONS 1-9: 13

## 2023-05-15 ASSESSMENT — ENCOUNTER SYMPTOMS
HEMATURIA: 0
NAUSEA: 1
SHORTNESS OF BREATH: 0
DIZZINESS: 1
ABDOMINAL PAIN: 0
BREAST MASS: 0
DIARRHEA: 0
HEMATOCHEZIA: 0
PALPITATIONS: 1
DYSURIA: 0
SORE THROAT: 0
JOINT SWELLING: 0
CONSTIPATION: 0
HEADACHES: 1
HEARTBURN: 0
ARTHRALGIAS: 1
MYALGIAS: 1
CHILLS: 0
PARESTHESIAS: 0
FREQUENCY: 0
NERVOUS/ANXIOUS: 1
EYE PAIN: 0
WEAKNESS: 0
COUGH: 1
FEVER: 0

## 2023-05-15 ASSESSMENT — PAIN SCALES - GENERAL: PAINLEVEL: NO PAIN (0)

## 2023-05-15 NOTE — PROGRESS NOTES
SUBJECTIVE:   CC: Elizabeth is an 22 year old who presents for preventive health visit.   Patient has been advised of split billing requirements and indicates understanding: Yes  Healthy Habits:     Getting at least 3 servings of Calcium per day:  Yes    Bi-annual eye exam:  NO    Dental care twice a year:  Yes    Sleep apnea or symptoms of sleep apnea:  Daytime drowsiness    Diet:  Regular (no restrictions)    Frequency of exercise:  None    Taking medications regularly:  Yes    Medication side effects:  Muscle aches and Lightheadedness    PHQ-2 Total Score: 4    Additional concerns today:  Yes    Answers for HPI/ROS submitted by the patient on 5/15/2023  If you checked off any problems, how difficult have these problems made it for you to do your work, take care of things at home, or get along with other people?: Somewhat difficult  PHQ9 TOTAL SCORE: 13        Today's PHQ-2 Score:       5/15/2023     4:38 PM   PHQ-2 ( 1999 [employer])   Q1: Little interest or pleasure in doing things 3   Q2: Feeling down, depressed or hopeless 1   PHQ-2 Score 4   Q1: Little interest or pleasure in doing things Nearly every day    Nearly every day   Q2: Feeling down, depressed or hopeless More than half the days    Several days   PHQ-2 Score 5    4       Have you ever done Advance Care Planning? (For example, a Health Directive, POLST, or a discussion with a medical provider or your loved ones about your wishes): No, advance care planning information given to patient to review.  Patient plans to discuss their wishes with loved ones or provider.      Social History     Tobacco Use     Smoking status: Never     Smokeless tobacco: Never     Tobacco comments:     VAPE-TRIED.   Vaping Use     Vaping status: Never Used   Substance Use Topics     Alcohol use: No             5/15/2023     4:38 PM   Alcohol Use   Prescreen: >3 drinks/day or >7 drinks/week? No     Reviewed orders with patient.  Reviewed health maintenance and updated orders  accordingly - Yes  BP Readings from Last 3 Encounters:   05/15/23 110/70   06/02/22 124/66   06/21/21 118/86    Wt Readings from Last 3 Encounters:   05/15/23 81.2 kg (179 lb)   06/02/22 81.6 kg (180 lb)   06/21/21 79.9 kg (176 lb 3.2 oz) (94 %, Z= 1.52)*     * Growth percentiles are based on [facility] (Girls, 2-20 Years) data.                  Patient Active Problem List   Diagnosis     Hemangioma     Halitosis     Palpitations     Heart murmur     SVT (supraventricular tachycardia) (H)     Tachycardia     Contraception, device intrauterine     History of cold sores     Past Surgical History:   Procedure Laterality Date     EP ABLATION CHILD N/A 2/6/2017    Procedure: EP ABLATION CHILD;  Surgeon: Clara Dias MD;  Location: [facility]     EP STUDY CHILD N/A 2/6/2017    Procedure: EP STUDY CHILD;  Surgeon: Clara Dias MD;  Location: [facility]     EP STUDY CHILD N/A 10/23/2017    Procedure: EP STUDY CHILD;  EP Study With Ablation ;  Surgeon: Clara Dias MD;  Location: [facility] OR       Social History     Tobacco Use     Smoking status: Never     Smokeless tobacco: Never     Tobacco comments:     VAPE-TRIED.   Vaping Use     Vaping status: Never Used   Substance Use Topics     Alcohol use: No     Family History   Problem Relation Age of Onset     Melanoma No family hx of          Current Outpatient Medications   Medication Sig Dispense Refill     atenolol (TENORMIN) 50 MG tablet Take 1 tablet (50 mg) by mouth daily 90 tablet 3     levonorgestrel (MIRENA) 20 MCG/24HR IUD 1 each (20 mcg) by Intrauterine route continuous         Breast Cancer Screening:        History of abnormal Pap smear: NO - age 21-29 PAP every 3 years recommended     Reviewed and updated as needed this visit by clinical staff    Allergies  Meds              Reviewed and updated as needed this visit by Provider                     Review of Systems   Constitutional: Negative for chills and fever.   HENT: Positive for congestion. Negative for  "ear pain, hearing loss and sore throat.    Eyes: Negative for pain and visual disturbance.   Respiratory: Positive for cough. Negative for shortness of breath.    Cardiovascular: Positive for palpitations. Negative for chest pain and peripheral edema.   Gastrointestinal: Positive for nausea. Negative for abdominal pain, constipation, diarrhea, heartburn and hematochezia.   Breasts:  Negative for tenderness, breast mass and discharge.   Genitourinary: Negative for dysuria, frequency, genital sores, hematuria, pelvic pain, urgency, vaginal bleeding and vaginal discharge.   Musculoskeletal: Positive for arthralgias and myalgias. Negative for joint swelling.   Skin: Negative for rash.   Neurological: Positive for dizziness and headaches. Negative for weakness and paresthesias.   Psychiatric/Behavioral: Negative for mood changes. The patient is nervous/anxious.      CONSTITUTIONAL: NEGATIVE for fever, chills, change in weight  INTEGUMENTARU/SKIN: NEGATIVE for worrisome rashes, moles or lesions  EYES: NEGATIVE for vision changes or irritation  ENT: NEGATIVE for ear, mouth and throat problems  RESP: NEGATIVE for significant cough or SOB  BREAST: NEGATIVE for masses, tenderness or discharge  CV: NEGATIVE for chest pain, palpitations or peripheral edema  GI: NEGATIVE for nausea, abdominal pain, heartburn, or change in bowel habits  : NEGATIVE for unusual urinary or vaginal symptoms. Periods are regular.  MUSCULOSKELETAL: NEGATIVE for significant arthralgias or myalgia  NEURO: NEGATIVE for weakness, dizziness or paresthesias  PSYCHIATRIC: NEGATIVE for changes in mood or affect     OBJECTIVE:   /70   Pulse 81   Temp 98  F (36.7  C) (Temporal)   Resp 18   Ht 1.638 m (5' 4.5\")   Wt 81.2 kg (179 lb)   SpO2 98%   BMI 30.25 kg/m    Physical Exam  GENERAL: healthy, alert and no distress  EYES: Eyes grossly normal to inspection, PERRL and conjunctivae and sclerae normal  HENT: ear canals and TM's normal, nose and mouth " "without ulcers or lesions  NECK: no adenopathy, no asymmetry, masses, or scars and thyroid normal to palpation  RESP: lungs clear to auscultation - no rales, rhonchi or wheezes  CV: regular rate and rhythm, normal S1 S2, no S3 or S4, no murmur, click or rub, no peripheral edema and peripheral pulses strong  ABDOMEN: soft, nontender, no hepatosplenomegaly, no masses and bowel sounds normal   (female): normal female external genitalia, normal urethral meatus, vaginal mucosa, normal cervix  MS: no gross musculoskeletal defects noted, no edema        ASSESSMENT/PLAN:   (Z86.79) Hx of supraventricular tachycardia  (primary encounter diagnosis)  I did set her up for a Zio patch 14-day.  We will take her off her atenolol and see how she responds.  Hopefully she can be off all medications for this I did get her into adult cardiology electrophysiologist.  Plan: Adult Cardiology Eval  Referral, Adult        Leadless EKG Monitor 8 to 14 Days      (Z11.3) Screening for STDs (sexually transmitted diseases)    Plan: NEISSERIA GONORRHOEA PCR, CHLAMYDIA TRACHOMATIS        PCR          (Z11.4) Screening for HIV (human immunodeficiency virus)        (Z11.59) Need for hepatitis C screening test        (Z12.4) Cervical cancer screening    Plan: Pap Screen only - recommended age 21 - 24 years          Well female exam with pelvic exam;               COUNSELING:  Reviewed preventive health counseling, as reflected in patient instructions      BMI:   Estimated body mass index is 30.25 kg/m  as calculated from the following:    Height as of this encounter: 1.638 m (5' 4.5\").    Weight as of this encounter: 81.2 kg (179 lb).         She reports that she has never smoked. She has never used smokeless tobacco.    Gage Jose MD  [facility]  "

## 2023-05-16 LAB
C TRACH DNA SPEC QL NAA+PROBE: NEGATIVE
N GONORRHOEA DNA SPEC QL NAA+PROBE: NEGATIVE

## 2023-05-18 ENCOUNTER — OFFICE VISIT (OUTPATIENT)
Dept: DERMATOLOGY | Facility: CLINIC | Age: 23
End: 2023-05-18
Payer: COMMERCIAL

## 2023-05-18 DIAGNOSIS — Q27.30 ARTERIO-VENOUS MALFORMATION: Primary | ICD-10-CM

## 2023-05-18 DIAGNOSIS — B00.9 HSV INFECTION: ICD-10-CM

## 2023-05-18 PROCEDURE — 99214 OFFICE O/P EST MOD 30 MIN: CPT | Performed by: PHYSICIAN ASSISTANT

## 2023-05-18 RX ORDER — VALACYCLOVIR HYDROCHLORIDE 1 G/1
TABLET, FILM COATED ORAL
Qty: 30 TABLET | Refills: 3 | Status: SHIPPED | OUTPATIENT
Start: 2023-05-18 | End: 2024-08-13

## 2023-05-18 ASSESSMENT — PAIN SCALES - GENERAL: PAINLEVEL: NO PAIN (0)

## 2023-05-18 NOTE — LETTER
5/18/2023         RE: Elizabeth Ulloa  [address] [facility] [id number]        Dear Colleague,    Thank you for referring your patient, Elizabeth Ulloa, to the [facility]. Please see a copy of my visit note below.    HPI:   Elizabeth Ulloa is a 22 year old female who presents for recheck of vascular growth on the scalp. Area is tender. She and mother wouldlike to discuss options for removal.     Location: right frontal scalp   Condition present for:  Many years - since childhood but not since birth.   Previous treatments include: has unknown laser years ago which did not seem to help.       PHYSICAL EXAM:    There were no vitals taken for this visit.  Skin exam performed as follows: Type 1 skin. Mood appropriate  Alert and Oriented X 3. Well developed, well nourished in no distress.  General appearance: Normal  Head including face: Normal  Eyes: conjunctiva and lids: Normal  Mouth: Lips, teeth, gums: Normal  Neck: Normal  Chest-breast/axillae: Normal  Back: Normal  Spleen and liver: Normal  Cardiovascular: Exam of peripheral vascular system by observation for swelling, varicosities, edema: Normal  Genitalia: groin, buttocks: Normal  Extremities: digits/nails (clubbing): Normal  Eccrine and Apocrine glands: Normal  Right upper extremity: Normal  Left upper extremity: Normal  Right lower extremity: Normal  Left lower extremity: Normal  Skin: Scalp and body hair: See below    1. 16 mm vascular plaque on the right frontal scalp    ASSESSMENT/PLAN:     1. 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 Saw peds derm in 2016 who questioned possible lymphatic involvement. She is quite nervous about removal and would like to be placed under general anesthesia.   --US ordered  --Depending on results will refer to ENT or vascular surgery    2. History of HSV - doing well on valtrex  --Refill of valtrex given          Follow-up: PRN  CC:   Scribed By: Rahel  [place] MS, PAJacquelynC        Again, thank you for allowing me to participate in the care of your patient.        Sincerely,        Rahel Muñoz PA-C

## 2023-05-18 NOTE — PROGRESS NOTES
HPI:   Elizabeth Ulloa is a 22 year old female who presents for recheck of vascular growth on the scalp. Area is tender. She and mother wouldlike to discuss options for removal.     Location: right frontal scalp   Condition present for:  Many years - since childhood but not since birth.   Previous treatments include: has unknown laser years ago which did not seem to help.       PHYSICAL EXAM:    There were no vitals taken for this visit.  Skin exam performed as follows: Type 1 skin. Mood appropriate  Alert and Oriented X 3. Well developed, well nourished in no distress.  General appearance: Normal  Head including face: Normal  Eyes: conjunctiva and lids: Normal  Mouth: Lips, teeth, gums: Normal  Neck: Normal  Chest-breast/axillae: Normal  Back: Normal  Spleen and liver: Normal  Cardiovascular: Exam of peripheral vascular system by observation for swelling, varicosities, edema: Normal  Genitalia: groin, buttocks: Normal  Extremities: digits/nails (clubbing): Normal  Eccrine and Apocrine glands: Normal  Right upper extremity: Normal  Left upper extremity: Normal  Right lower extremity: Normal  Left lower extremity: Normal  Skin: Scalp and body hair: See below    1. 16 mm vascular plaque on the right frontal scalp    ASSESSMENT/PLAN:     1. 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 Saw [facility] derm in 2016 who questioned possible lymphatic involvement. She is quite nervous about removal and would like to be placed under general anesthesia.   --US ordered  --Depending on results will refer to ENT or vascular surgery    2. History of HSV - doing well on valtrex  --Refill of valtrex given          Follow-up: PRN  CC:   Scribed By: Rahel Coleman, MS, PAAJ

## 2023-05-19 ENCOUNTER — HOSPITAL ENCOUNTER (OUTPATIENT)
Dept: CARDIOLOGY | Facility: CLINIC | Age: 23
Discharge: HOME | End: 2023-05-19
Attending: FAMILY MEDICINE | Admitting: FAMILY MEDICINE
Payer: COMMERCIAL

## 2023-05-19 DIAGNOSIS — Z86.79 HX OF SUPRAVENTRICULAR TACHYCARDIA: ICD-10-CM

## 2023-05-19 LAB
BKR LAB AP GYN ADEQUACY: NORMAL
BKR LAB AP GYN INTERPRETATION: NORMAL
BKR LAB AP HPV REFLEX: NO
BKR LAB AP PREVIOUS ABNORMAL: NORMAL
PATH REPORT.COMMENTS IMP SPEC: NORMAL
PATH REPORT.COMMENTS IMP SPEC: NORMAL
PATH REPORT.RELEVANT HX SPEC: NORMAL

## 2023-05-19 PROCEDURE — 93246 EXT ECG>7D<15D RECORDING: CPT

## 2023-05-30 ENCOUNTER — HOSPITAL ENCOUNTER (OUTPATIENT)
Dept: ULTRASOUND IMAGING | Facility: CLINIC | Age: 23
Discharge: HOME | End: 2023-05-30
Attending: PHYSICIAN ASSISTANT | Admitting: PHYSICIAN ASSISTANT
Payer: COMMERCIAL

## 2023-05-30 DIAGNOSIS — Q27.30 ARTERIO-VENOUS MALFORMATION: ICD-10-CM

## 2023-05-30 PROCEDURE — 76536 US EXAM OF HEAD AND NECK: CPT

## 2023-06-09 ENCOUNTER — TELEPHONE (OUTPATIENT)
Dept: CARDIOLOGY | Facility: CLINIC | Age: 23
End: 2023-06-09
Payer: COMMERCIAL

## 2023-06-09 NOTE — TELEPHONE ENCOUNTER
RECORDS RECEIVED FROM: Internal    DIAGNOSIS: Hx of supraventricular tachycardia   DATE RECEIVED: 05/15/23   NOTES STATUS DETAILS   OFFICE NOTE from referring provider  Internal Referred by Dr. Jose   OFFICE NOTE from other specialist   Internal Previously saw Dr. Miramontes in [place] back in June 2021   DISCHARGE SUMMARY from hospital  N/A    DISCHARGE REPORT from the ER N/A    OPERATIVE REPORT  N/A    MEDICATION LIST Internal    LABS      N/A    DIAGNOSTIC PROCEDURES     ZIO  Internal 05/19/23   EKG  Internal 01/13/2020   IMAGING (DISC & REPORT)      CT  N/A    MRI N/A    XRAY N/A    ULTRASOUND  N/A

## 2023-07-17 NOTE — PROGRESS NOTES
I am delighted to see Elizabeth Ulloa as a new patient in [place] [facility] clinic for evaluation of palpitations. She is here with her mom and her grandmother.    History of Present Illness:  The patient is a 22 year old  Female with a h/o palpitations and SVT noted on ambulatory monitors since age 13. Her mother remembers that the first episode was when she was visiting a family member who just had a baby, Elizabeth was in hospital, then suddenly turned pale and fainted. After this time she started having episodes of racing heart beats. She had been seen by pediatric cardiology, several ambulatory monitors showed mostly sinus tachycardia. One episode of possible atrial tachycardia averaged 220 bpm for 20 minutes was seen in 2016. An EP study was performed Feb 2017 at which time dual AV node physiology was noted but no SVT induced. No ablations were performed. Because of ongoing symptoms a repeat EP study was done in October 2017 with same results. She has been treated with atenolol since then. About 2 years ago she tried metoprolol but it made her feel very fatigued, so she was switched back on atenolol. She does not think the atenolol is doing much lately, so it was stopped in May 2023. Subsequently an ambulatory monitor was given to her by PCP and she had many episodes.    She works at a gas station, stands up a lot for work. Several times a week she would have episodes of feeling like her heart would kick, then she wouldn't feel great. She doesn't really have racing heart beats, just a generalized feeling of being unwell, usually for about 20 minutes. No syncope. She does drink about half a gallon of water a day, no significant caffeine use, does not smoke/vape, alcohol sometimes. She does not do much exercise because she's anxious that she would have episodes.    Past Medical History:  Syncope and palpitations as above.       Medications:   Mirena IUD  Valtrex prn      Allergies:    Allergies   Allergen  Reactions     Latex Rash     burn         Physical examination  Vitals: 136/81, HR 78  BMI= 30 (83 kg)    Constitutional: In general, the patient is a pleasant female in no acute distress.    Cardiovascular: Carotids +2/2 bilaterally without bruits.  No jugular venous distension. Regular rate and rhythm. Normal S1, S2. No murmur, rub, click, or gallop.   Extremities: Pulses are normal bilaterally throughout. No peripheral edema.  Respiratory: Clear to asculation.  No ronchi, wheezes, rales.  No dullness to percussion.     I have personally and independently reviewed the following:    Echo 10/4/2016: normal EF, no valve disease    EKG 1/13/2020: sinus, no preexcitation    Patch monitors   5/19-5/30/2023:  Sinus average 92 bpm, range  bpm  AV Wenckebach noted, between 1-8 am  Max sinus 171 bpm with normal RI interval and 1:1 conduction  All symptoms correlated with sinus tachycardia    Review of all ambulatory monitors show one episode of SVT -   26 minutes, average rate 227 bpm, with symptoms, strips suggest AT      Termination      EP studies Dr. Dias:  2/6/2017: dual AV node physiology present, no accessory pathway, noninducible; no ablations  10/23/2017: same    Assessment :  Palpitations. On her most recent ambulatory monitor, all symptoms correlated with sinus rhythm and sinus tachycardia. I have reviewed all of her many ambulatory monitors and all symptoms correlated with sinus tachycardia with the exception of the one episode shown above on 8/29/2016, which was likely atrial tachycardia. Two EP studies show AV node physiology (which can be seen in a third of patients undergoing EP studies without participating in tachycardia) - no SVTs were induced.    My summary of findings is that she likely has symptomatic sinus tachycardia.  I offered reassurance. Discussed the importance of aggressive hydration, she may want to increase her water to near one gallon a day, especially in summer. Recommend daily  compression stocking use, and importantly, regular exercise on a daily basis. Deconditioning will worsen her current symptoms. Atenolol wasn't really doing much lately after 5 years of treatment. If she still feels sinus tach can consider a trial of low dose diltiazem. However I favor lifestyle modification over medications.     I have answerer her questions, as well as those of her mom and grandmother. They appeared to be satisfied with our visit. She can follow up as needed.        I spent a total of 45 minutes face to face with  Elizabeth Ulloa during today's office visit. I have spend an additional 30 minutes today on chart review and documentation.      The patient is to return as above . The patient understood the treatment plan as outlined above.  There were no barriers to learning.      Chaya Hollins MD

## 2023-07-18 ENCOUNTER — OFFICE VISIT (OUTPATIENT)
Dept: CARDIOLOGY | Facility: CLINIC | Age: 23
End: 2023-07-18
Payer: COMMERCIAL

## 2023-07-18 VITALS
HEART RATE: 78 BPM | DIASTOLIC BLOOD PRESSURE: 81 MMHG | WEIGHT: 183.6 LBS | BODY MASS INDEX: 31.03 KG/M2 | OXYGEN SATURATION: 99 % | SYSTOLIC BLOOD PRESSURE: 136 MMHG

## 2023-07-18 DIAGNOSIS — R00.2 PALPITATIONS: ICD-10-CM

## 2023-07-18 DIAGNOSIS — R00.0 SINUS TACHYCARDIA: Primary | ICD-10-CM

## 2023-07-18 PROCEDURE — 99215 OFFICE O/P EST HI 40 MIN: CPT | Performed by: INTERNAL MEDICINE

## 2023-07-18 PROCEDURE — 99417 PROLNG OP E/M EACH 15 MIN: CPT | Performed by: INTERNAL MEDICINE

## 2023-07-18 NOTE — PROGRESS NOTES
Elizabeth Ulloa's goals for this visit include: Stopped taking atenolol for the monitor.   She requests these members of her care team be copied on today's visit information: Gage Jose    PCP: No Ref-Primary, Physician    Referring Provider:  Gage Jose MD  [address] [place] [address] [place] [address]    /81 (BP Location: Right arm, Patient Position: Sitting, Cuff Size: Adult Regular)   Pulse 78   Wt 83.3 kg (183 lb 9.6 oz)   SpO2 99%   BMI 31.03 kg/m      Do you need any medication refills at today's visit? No.    Kris Esteban, EMT  Clinic Support  [facility]    [phone]    Employed by [facility]

## 2024-06-16 ENCOUNTER — HEALTH MAINTENANCE LETTER (OUTPATIENT)
Age: 24
End: 2024-06-16

## 2024-08-13 ENCOUNTER — MYC REFILL (OUTPATIENT)
Dept: DERMATOLOGY | Facility: CLINIC | Age: 24
End: 2024-08-13
Payer: COMMERCIAL

## 2024-08-13 DIAGNOSIS — B00.9 HSV INFECTION: ICD-10-CM

## 2024-08-13 NOTE — LETTER
[facility] clinic  [address] [place]  [address]  Phone: [phone]    2024    Elizabeth Ulloa  [id number] [place] [address]    Dear Elizabeth,     We recently provided you with a medication refill. Prescription medications require routine follow-up appointments with your Dermatology Provider.      Per [facility] medication refill protocol, you do need to be seen at least annually to renew a prescription while on prescribed medication(s). A prescription is valid for only one year before it expires.      At this time we ask that: You schedule a routine follow up Dermatology office visit to  renew your now  Valtrex prescription.    Your prescription: Has  as it is over 1 year old. You have been given a one time tai refill of medication. You were last seen on 2023.    We are currently booking Dermatology appointments into 2025 as we do book Dermatology appointments 6 moths in advance, so please schedule follow up Dermatology appointment as soon as possible to avoid going without medication.    [phone] to schedule a Dermatology appointment-all locations. You may be seen for follow up with any of our 3 Dermatology Providers via telephone or virtual video if you prefer-as long as you have been seen in person once in the last 3 years..     Sincerely,     Rahel Coleman PA-C/bi

## 2024-08-19 RX ORDER — VALACYCLOVIR HYDROCHLORIDE 1 G/1
TABLET, FILM COATED ORAL
Qty: 30 TABLET | Refills: 0 | Status: SHIPPED | OUTPATIENT
Start: 2024-08-19

## 2025-05-08 ENCOUNTER — OFFICE VISIT (OUTPATIENT)
Dept: DERMATOLOGY | Facility: CLINIC | Age: 25
End: 2025-05-08
Attending: PHYSICIAN ASSISTANT
Payer: COMMERCIAL

## 2025-05-08 DIAGNOSIS — B00.9 HSV INFECTION: ICD-10-CM

## 2025-05-08 DIAGNOSIS — B95.8 STAPH INFECTION: ICD-10-CM

## 2025-05-08 LAB
GRAM STAIN RESULT: ABNORMAL
GRAM STAIN RESULT: ABNORMAL

## 2025-05-08 RX ORDER — MUPIROCIN 20 MG/G
OINTMENT TOPICAL
COMMUNITY
Start: 2024-09-27

## 2025-05-08 RX ORDER — VALACYCLOVIR HYDROCHLORIDE 1 G/1
TABLET, FILM COATED ORAL
Qty: 30 TABLET | Refills: 2 | Status: SHIPPED | OUTPATIENT
Start: 2025-05-08

## 2025-05-08 NOTE — LETTER
5/8/2025      Elizabeth Ulloa  [address]  [facility] [address]      Dear Colleague,    Thank you for referring your patient, Elizabeth Ulloa, to the [facility]. Please see a copy of my visit note below.    [facility] Dermatology Note  Encounter Date: May 8, 2025  Office Visit     Reviewed patients past medical history and pertinent chart review prior to patients visit today.     Dermatology Problem List:  1.  History of herpes simplex virus, Valtrex during flares  2.  History of impetigo and nostril lesions, bacterial swab obtained 5/8/2025, mupirocin 2% ointment  3.  History of vascular malformation, right frontal scalp    ____________________________________________    CC: Derm Problem (Refill Valtrex/Can patient be check for Impetigo in nose? Gets it in nose and then has a crack in nose and it gets really itchy )    HPI:  Ms. Elizabeth Ulloa is a(n) 24 year old female who presents today as a return patient with her mother.  The patient has a history of cold sores and is requesting refills of Valtrex.  She will experience these about 3-4 times a year.  The patient was on Valtrex daily in the past.  However, using the medication as needed at first signs and symptoms has been effective for her.  The patient also notes a history of impetigo around the mouth, as well as lesions involving the nostril.  She will often experience a fissure involving the left nostril that cracks and bleeds. She also endorses itching of the nose. The patient is inquiring if she could be tested for a staph infection.  She has been prescribed mupirocin ointment in the past.    Patient is otherwise feeling well, without additional skin concerns.    Medications:  Current Outpatient Medications   Medication Sig Dispense Refill     mupirocin (BACTROBAN) 2 % external ointment apply a small amount to the affected area by topical route three times daily       valACYclovir (VALTREX) 1000 mg  PAST MEDICAL HISTORY:  No pertinent past medical history      tablet 2 tabs at symptom onset; 2 more 12 hours later for one day only. 30 tablet 2     atenolol (TENORMIN) 50 MG tablet Take 1 tablet (50 mg) by mouth daily (Patient not taking: Reported on 7/18/2023) 90 tablet 3     levonorgestrel (MIRENA) 20 MCG/24HR IUD 1 each (20 mcg) by Intrauterine route continuous       No current facility-administered medications for this visit.      Past Medical History:   Patient Active Problem List   Diagnosis     Hemangioma     Halitosis     Palpitations     Heart murmur     SVT (supraventricular tachycardia)     Tachycardia     Contraception, device intrauterine     History of cold sores     Past Medical History:   Diagnosis Date     Contraception, device intrauterine 07/30/2018    Mirena - due for removal 7/2023.      Hemangioma      SVT (supraventricular tachycardia)     sees EP specialist       ____________________________________________     Physical Exam:  Vitals: There were no vitals taken for this visit.   SKIN: The exam included face.  - Nguyen II.  - No cold sores upon examination today  - Left anterior nostril, small pink fissure    - No other lesions of concern on areas examined.     _________________________________________    Assessment & Plan:   # History of herpes simplex virus  Refills of Valtrex 2000 mg twice daily, 12 hours apart, at first signs and symptoms of lesions x 1 day refilled.  The patient experiences cold sores about 3-4 times a year and states Valtrex has been very effective for her in the past in shortening the duration of lesions.    # History of impetigo and nostril lesions  # Small pink fissure, left anterior nostril  Bacterial swab was obtained today to rule out a staph infection.  In the meantime, I recommend applying mupirocin 2% ointment 3 times daily for 7 to 10 days during flares.  Results will be communicated via [employer].    All risks, benefits and alternatives were discussed with patient.  Patient is in agreement and understands the  assessment and plan.  All questions were answered.    Dalila Richardson PA-C  [facility]    CC Referred Self, MD  No address on file on close of this encounter.    Again, thank you for allowing me to participate in the care of your patient.        Sincerely,        Dalila Richardson PA-C    Electronically signed

## 2025-05-08 NOTE — PROGRESS NOTES
[facility] Dermatology Note  Encounter Date: May 8, 2025  Office Visit     Reviewed patients past medical history and pertinent chart review prior to patients visit today.     Dermatology Problem List:  1.  History of herpes simplex virus, Valtrex during flares  2.  History of impetigo and nostril lesions, bacterial swab obtained 5/8/2025, mupirocin 2% ointment  3.  History of vascular malformation, right frontal scalp    ____________________________________________    CC: Derm Problem (Refill Valtrex/Can patient be check for Impetigo in nose? Gets it in nose and then has a crack in nose and it gets really itchy )    HPI:  Ms. Elizabeth Ulloa is a(n) 24 year old female who presents today as a return patient with her mother.  The patient has a history of cold sores and is requesting refills of Valtrex.  She will experience these about 3-4 times a year.  The patient was on Valtrex daily in the past.  However, using the medication as needed at first signs and symptoms has been effective for her.  The patient also notes a history of impetigo around the mouth, as well as lesions involving the nostril.  She will often experience a fissure involving the left nostril that cracks and bleeds. She also endorses itching of the nose. The patient is inquiring if she could be tested for a staph infection.  She has been prescribed mupirocin ointment in the past.    Patient is otherwise feeling well, without additional skin concerns.    Medications:  Current Outpatient Medications   Medication Sig Dispense Refill    mupirocin (BACTROBAN) 2 % external ointment apply a small amount to the affected area by topical route three times daily      valACYclovir (VALTREX) 1000 mg tablet 2 tabs at symptom onset; 2 more 12 hours later for one day only. 30 tablet 2    atenolol (TENORMIN) 50 MG tablet Take 1 tablet (50 mg) by mouth daily (Patient not taking: Reported on 7/18/2023) 90 tablet 3    levonorgestrel (MIRENA) 20  MCG/24HR IUD 1 each (20 mcg) by Intrauterine route continuous       No current facility-administered medications for this visit.      Past Medical History:   Patient Active Problem List   Diagnosis    Hemangioma    Halitosis    Palpitations    Heart murmur    SVT (supraventricular tachycardia)    Tachycardia    Contraception, device intrauterine    History of cold sores     Past Medical History:   Diagnosis Date    Contraception, device intrauterine 07/30/2018    Mirena - due for removal 7/2023.     Hemangioma     SVT (supraventricular tachycardia)     sees EP specialist       ____________________________________________     Physical Exam:  Vitals: There were no vitals taken for this visit.   SKIN: The exam included face.  - Nguyen II.  - No cold sores upon examination today  - Left anterior nostril, small pink fissure    - No other lesions of concern on areas examined.     _________________________________________    Assessment & Plan:   # History of herpes simplex virus  Refills of Valtrex 2000 mg twice daily, 12 hours apart, at first signs and symptoms of lesions x 1 day refilled.  The patient experiences cold sores about 3-4 times a year and states Valtrex has been very effective for her in the past in shortening the duration of lesions.    # History of impetigo and nostril lesions  # Small pink fissure, left anterior nostril  Bacterial swab was obtained today to rule out a staph infection.  In the meantime, I recommend applying mupirocin 2% ointment 3 times daily for 7 to 10 days during flares.  Results will be communicated via [employer].    All risks, benefits and alternatives were discussed with patient.  Patient is in agreement and understands the assessment and plan.  All questions were answered.    Dalila Richardson PA-C  [facility]    CC Referred MD Geovany  No address on file on close of this encounter.

## 2025-05-10 LAB
BACTERIA WND CULT: ABNORMAL
GRAM STAIN RESULT: ABNORMAL
GRAM STAIN RESULT: ABNORMAL

## 2025-05-12 ENCOUNTER — RESULTS FOLLOW-UP (OUTPATIENT)
Dept: DERMATOLOGY | Facility: CLINIC | Age: 25
End: 2025-05-12

## 2025-06-21 ENCOUNTER — HEALTH MAINTENANCE LETTER (OUTPATIENT)
Age: 25
End: 2025-06-21

## (undated) RX ORDER — PROPOFOL 10 MG/ML
INJECTION, EMULSION INTRAVENOUS
Status: DISPENSED
Start: 2017-10-23

## (undated) RX ORDER — LIDOCAINE HYDROCHLORIDE 20 MG/ML
INJECTION, SOLUTION EPIDURAL; INFILTRATION; INTRACAUDAL; PERINEURAL
Status: DISPENSED
Start: 2017-10-23

## (undated) RX ORDER — LIDOCAINE HYDROCHLORIDE 10 MG/ML
INJECTION, SOLUTION EPIDURAL; INFILTRATION; INTRACAUDAL; PERINEURAL
Status: DISPENSED
Start: 2017-02-06

## (undated) RX ORDER — DEXAMETHASONE SODIUM PHOSPHATE 4 MG/ML
INJECTION, SOLUTION INTRA-ARTICULAR; INTRALESIONAL; INTRAMUSCULAR; INTRAVENOUS; SOFT TISSUE
Status: DISPENSED
Start: 2017-10-23

## (undated) RX ORDER — ONDANSETRON 2 MG/ML
INJECTION INTRAMUSCULAR; INTRAVENOUS
Status: DISPENSED
Start: 2017-10-23

## (undated) RX ORDER — ADENOSINE 3 MG/ML
INJECTION, SOLUTION INTRAVENOUS
Status: DISPENSED
Start: 2017-02-06

## (undated) RX ORDER — HEPARIN SODIUM 1000 [USP'U]/ML
INJECTION, SOLUTION INTRAVENOUS; SUBCUTANEOUS
Status: DISPENSED
Start: 2017-10-23

## (undated) RX ORDER — SODIUM CHLORIDE, SODIUM LACTATE, POTASSIUM CHLORIDE, CALCIUM CHLORIDE 600; 310; 30; 20 MG/100ML; MG/100ML; MG/100ML; MG/100ML
INJECTION, SOLUTION INTRAVENOUS
Status: DISPENSED
Start: 2017-02-06

## (undated) RX ORDER — FENTANYL CITRATE 50 UG/ML
INJECTION, SOLUTION INTRAMUSCULAR; INTRAVENOUS
Status: DISPENSED
Start: 2017-10-23

## (undated) RX ORDER — BUPIVACAINE HYDROCHLORIDE 2.5 MG/ML
INJECTION, SOLUTION EPIDURAL; INFILTRATION; INTRACAUDAL
Status: DISPENSED
Start: 2017-10-23

## (undated) RX ORDER — BUPIVACAINE HYDROCHLORIDE 2.5 MG/ML
INJECTION, SOLUTION EPIDURAL; INFILTRATION; INTRACAUDAL
Status: DISPENSED
Start: 2017-02-06

## (undated) RX ORDER — ADENOSINE 3 MG/ML
INJECTION, SOLUTION INTRAVENOUS
Status: DISPENSED
Start: 2017-10-23

## (undated) RX ORDER — HEPARIN SODIUM 1000 [USP'U]/ML
INJECTION, SOLUTION INTRAVENOUS; SUBCUTANEOUS
Status: DISPENSED
Start: 2017-02-06

## (undated) RX ORDER — LIDOCAINE HYDROCHLORIDE 10 MG/ML
INJECTION, SOLUTION EPIDURAL; INFILTRATION; INTRACAUDAL; PERINEURAL
Status: DISPENSED
Start: 2017-10-23